# Patient Record
Sex: MALE | Race: WHITE
[De-identification: names, ages, dates, MRNs, and addresses within clinical notes are randomized per-mention and may not be internally consistent; named-entity substitution may affect disease eponyms.]

---

## 2021-06-25 ENCOUNTER — HOSPITAL ENCOUNTER (OUTPATIENT)
Dept: HOSPITAL 47 - RADNMMAIN | Age: 81
Discharge: HOME | End: 2021-06-25
Attending: FAMILY MEDICINE
Payer: MEDICARE

## 2021-06-25 DIAGNOSIS — R94.31: Primary | ICD-10-CM

## 2021-06-25 PROCEDURE — 93351 STRESS TTE COMPLETE: CPT

## 2021-06-25 NOTE — P.STRESS
- Stress Test Note


Stress Test Results/Findings: 


Exam Performed:  stress echo exercise with con


Exam Date: 06/25/21


Reason for Exam: ABNORMAL EKG





Height: 5 ft 7 in


Weight: 98.883 kg


Protocol: RICHARDSON


Stage: 1


Duration of Exercise: 1:39





Resting Heart Rate: 79


Resting Blood Pressure: 158/75


Maximum Achieved Heart Rate: 124


Maximum Achieved Blood Pressure: 158/75


85% PMHR: 119


100% PMHR: 140


METS: 2.8


Technologist Comment: 





Stress Test Results/Findings: 


Patient underwent exercise stress echo with a Richardson protocol treadmill stress 

test.  Patient exercised into Stage 1 for a total of 1 minute 39 seconds 

reaching a total of 2.8 METS.  Patient's maximum heart rate was 124 which 

represented 89 % age-predicted maximum heart rate.





Stress EKG portion:


At baseline patient's EKG showed normal sinus rhythm, normal axis, ST 

depressions V4 through V6, 2, 3, aVF.  At peak exercise, EKG showed accentuation

of ST depressions with more downsloping ST depressions up to 1 mm in the 

inferior and lateral leads which is nonspecific given Baseline abnormal EKG, 

frequent PVCs.





Stress echo portion:


2-D echocardiogram was performed in the parasternal long, personal short, apical

2 and apical four-chamber views at rest, peak exercise and in recovery.  At 

baseline, echocardiogram showed left ventricular ejection fraction 55% without 

wall motion abnormalities.  With peak exercise, echocardiogram shows basal to 

mid inferior and basal to mid inferior lateral induced hypokinesis consistent 

with ischemia.





Conclusions:


1.  Abnormal stress test with inducible inferior ischemia by echocardiogram


2.  Baseline abnormal EKG


3.  Normal ejection fraction 55%


 4. Poor exercise capacity.

## 2021-07-16 ENCOUNTER — HOSPITAL ENCOUNTER (OUTPATIENT)
Dept: HOSPITAL 47 - CATHCVL | Age: 81
End: 2021-07-16
Attending: INTERNAL MEDICINE
Payer: MEDICARE

## 2021-07-16 VITALS — BODY MASS INDEX: 35.2 KG/M2

## 2021-07-16 VITALS — RESPIRATION RATE: 18 BRPM | SYSTOLIC BLOOD PRESSURE: 149 MMHG | DIASTOLIC BLOOD PRESSURE: 70 MMHG

## 2021-07-16 VITALS — HEART RATE: 64 BPM

## 2021-07-16 VITALS — TEMPERATURE: 97.3 F

## 2021-07-16 DIAGNOSIS — Z87.891: ICD-10-CM

## 2021-07-16 DIAGNOSIS — E78.5: ICD-10-CM

## 2021-07-16 DIAGNOSIS — I10: ICD-10-CM

## 2021-07-16 DIAGNOSIS — I25.10: ICD-10-CM

## 2021-07-16 DIAGNOSIS — I67.82: Primary | ICD-10-CM

## 2021-07-16 LAB
ANION GAP SERPL CALC-SCNC: 6 MMOL/L
APTT BLD: 158 SEC (ref 22–30)
BASOPHILS # BLD AUTO: 0 K/UL (ref 0–0.2)
BASOPHILS NFR BLD AUTO: 0 %
BUN SERPL-SCNC: 15 MG/DL (ref 9–20)
CALCIUM SPEC-MCNC: 7.7 MG/DL (ref 8.4–10.2)
CHLORIDE SERPL-SCNC: 112 MMOL/L (ref 98–107)
CO2 SERPL-SCNC: 21 MMOL/L (ref 22–30)
EOSINOPHIL # BLD AUTO: 0 K/UL (ref 0–0.7)
EOSINOPHIL NFR BLD AUTO: 0 %
ERYTHROCYTE [DISTWIDTH] IN BLOOD BY AUTOMATED COUNT: 3.81 M/UL (ref 4.3–5.9)
ERYTHROCYTE [DISTWIDTH] IN BLOOD: 12.6 % (ref 11.5–15.5)
GLUCOSE BLD-MCNC: 134 MG/DL (ref 75–99)
GLUCOSE SERPL-MCNC: 130 MG/DL (ref 74–99)
HCT VFR BLD AUTO: 34.5 % (ref 39–53)
HGB BLD-MCNC: 12.1 GM/DL (ref 13–17.5)
INR PPP: 1.4 (ref ?–1.2)
LYMPHOCYTES # SPEC AUTO: 0.8 K/UL (ref 1–4.8)
LYMPHOCYTES NFR SPEC AUTO: 9 %
MCH RBC QN AUTO: 31.8 PG (ref 25–35)
MCHC RBC AUTO-ENTMCNC: 35.2 G/DL (ref 31–37)
MCV RBC AUTO: 90.4 FL (ref 80–100)
MONOCYTES # BLD AUTO: 0.2 K/UL (ref 0–1)
MONOCYTES NFR BLD AUTO: 2 %
NEUTROPHILS # BLD AUTO: 8.5 K/UL (ref 1.3–7.7)
NEUTROPHILS NFR BLD AUTO: 89 %
PLATELET # BLD AUTO: 158 K/UL (ref 150–450)
POTASSIUM SERPL-SCNC: 3.7 MMOL/L (ref 3.5–5.1)
PT BLD: 14.3 SEC (ref 9–12)
SODIUM SERPL-SCNC: 139 MMOL/L (ref 137–145)
WBC # BLD AUTO: 9.6 K/UL (ref 3.8–10.6)

## 2021-07-16 PROCEDURE — 70498 CT ANGIOGRAPHY NECK: CPT

## 2021-07-16 PROCEDURE — 85025 COMPLETE CBC W/AUTO DIFF WBC: CPT

## 2021-07-16 PROCEDURE — 93458 L HRT ARTERY/VENTRICLE ANGIO: CPT

## 2021-07-16 PROCEDURE — 70450 CT HEAD/BRAIN W/O DYE: CPT

## 2021-07-16 PROCEDURE — 80048 BASIC METABOLIC PNL TOTAL CA: CPT

## 2021-07-16 PROCEDURE — 85730 THROMBOPLASTIN TIME PARTIAL: CPT

## 2021-07-16 PROCEDURE — 85610 PROTHROMBIN TIME: CPT

## 2021-07-16 PROCEDURE — 70496 CT ANGIOGRAPHY HEAD: CPT

## 2021-07-16 NOTE — CT
EXAMINATION TYPE: CT brain wo con for TPA

 

DATE OF EXAM: 7/16/2021

 

COMPARISON: None

 

HISTORY: altered mental status, Rt sided weakness

 

CT DLP: 1036 mGycm

Automated exposure control for dose reduction was used.

 

FINDINGS: 

Mild to moderate generalized degenerative change. No midline shift. Exam limited by motion. Low-atten
uation the white matter is nonspecific but most typical remote microvascular ischemia. No acute hemor
rhage or mass effect. Orbits symmetric. Mild changes of chronic sinusitis. Craniocervical junction ma
intained.

 

IMPRESSION: 

1. LIMITED EXAM DUE TO MOTION ARTIFACT. DEGENERATIVE AND NONSPECIFIC WHITE MATTER CHANGES MOST TYPICA
L OF REMOTE WHITE MATTER ISCHEMIA. IF THERE IS CONCERN FOR ACUTE ISCHEMIA CORRELATE WITH MRI AS CLINI
DOMINIK WARRANTED.

2. NO EVIDENCE OF ACUTE HEMORRHAGE OR MASS EFFECT.

## 2021-07-16 NOTE — CT
EXAMINATION TYPE: CT angio head neck

 

DATE OF EXAM: 7/16/2021

 

HISTORY: CVA

 

COMPARISON: CT scan 7/16/2021

 

CT DLP: 407 mGycm.  Automated Exposure Control for Dose Reduction was Utilized.

 

TECHNIQUE:  CTA scan of the neck is performed without and with IV Contrast, patient injected with 65 
ml mL of Isovue 370, axial images are obtained, coronal and sagittal reformatted images are reviewed.
 Three-D reconstructed images are created on an independent workstation and reviewed.

 

FINDINGS:

 

There is abrupt truncation of the left middle cerebral artery suspicious for left MCA occlusion. Vert
ebrobasilar system is patent with slightly dominant right vertebral artery. Posterior cerebral arteri
es are somewhat diminutive in size but appear to enhance. Degenerative change of the spine and low at
tenuation white matter is nonspecific but most typical remote white matter ischemia. No sizable aneur
ysm or vascular malformation.

 

It is extensive irregularity involving the carotid arteries on the right with the plaque involving th
e proximal internal carotid artery. Approximately 60-70% stenosis suspected. Assessment of the left p
roximal ICA is limited. There is extensive atherosclerotic plaque and suspected significant greater t
key 70% stenosis. Groundglass changes seen in the bilateral upper lobes. Hypertrophic and degenerativ
e changes of the spine. Atherosclerotic change of the aorta.

 

IMPRESSION:

1. 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 Report called to referring clinician.

2. Suspected right proximal ICA significant stenosis. Evaluation of the left ICA is limited due to te
chnical factors could not exclude a significant stenosis.

## 2021-07-16 NOTE — CC
CARDIAC CATHETERIZATION REPORT



PROCEDURE:

Cardiac catheterization.



INDICATION:

Exertional shortness of breath with abnormal stress echo showing ischemia in the

inferior and inferolateral zone.



PROCEDURE NOTE:

After obtaining informed consent,, left heart catheterization and coronary angiogram

are performed via the right femoral artery using standard Kirby catheters. The

patient tolerated the procedure well without any obvious immediate complications.  A

femoral angiogram was performed and Angio-Seal will be deployed for hemostasis.



HEMODYNAMICS:

Left ventricular end-diastolic pressure is 14-16 mm.  There is no significant gradient

across the aortic valve.



LEFT VENTRICULOGRAM:

Not performed.



ANGIOGRAPHIC DATA:

The left main coronary artery: Left main coronary artery is a normal-sized vessel and

is free of stenosis.  Divides into left anterior descending coronary artery and

circumflex coronary artery. Circumflex coronary artery is a nondominant vessel, gives

off a large caliber OM branch and continues as a small AV groove circ.  There are 2

areas of tight stenosis within the OM, the proximal 70-80% and distal focal 70%

stenosis.  LAD shows a mild atherosclerotic plaque in its midportion. Right coronary

artery is a large dominant vessel, shows mild diffuse disease involving the PDA and PLV

branches.



CONCLUSIONS:

Severe single-vessel coronary artery disease, which explains his stress test

abnormality.



PLAN:

I reviewed angiographic data with Dr. Baez, the on-call interventionist, who will

attempt angioplasty of circumflex coronary artery.





MMODL / IJN: [id number] / Job#: [id number]

## 2021-07-16 NOTE — PTCA
PERCUTANEOUSTRANS CORORONARY ANGIOGRAPHY



DATE OF SERVICE:

07/16/2021



PERFORMING PHYSICIAN:

Mark Baez MD.



PROCEDURE PERFORMED:

1. Successful stenting of the distal left circumflex coronary artery using 2.5 x 15 mm

    Xience drug-eluting stent with an excellent angiographic result.

2. Successful stenting of the proximal left circumflex using 3.5 x 18 mm Xience drug-

    eluting stent with an excellent angiographic result.



INDICATION:

This is an 81-year-old gentleman who sees Dr. Jeffers in the office regularly, who was

experiencing symptoms of chest discomfort and underwent a heart catheterization earlier

today and that showed severe disease involving the distal and proximal left circumflex.

Because of that, a PCI was advised.



APPROACH:

Right common femoral artery.



COMPLICATION:

None.



LEVEL OF SEDATION:

Moderate with sedation length of 30 minutes.



PROCEDURE DESCRIPTION:

Please refer to diagnostic heart catheterization that was performed by Dr. Jeffers

earlier today.



Anticoagulation was initiated using heparin.



Subsequently, the left main was engaged using an EBU guide.  I wired the left

circumflex using a run-through wire.  After that, I did balloon angioplasty using 2.5 x

12 mm balloon for the distal and proximal left circumflex.  Attempting advancing 2.5 mm

stent to the distal left circumflex was unsuccessful because the stent would not make

the turn in the proximal tortuous area.  I did balloon angioplasty again using 3.5 mm

balloon and in spite of that the stent was unable to go.  At that point, I decided to

use a GuideLiner.  With GuideLiner use, I was able to get the stent distally, as well

as proximally.  Distally I deployed 2.5 x 15 mm stent and proximally I deployed 3.5 x

18 mm.  Both stents were deployed under 14 atmospheres for 20 seconds with an excellent

angiographic result.



POSTPROCEDURE MANAGEMENT:

1. Dual anti-platelet therapy.

2. Aggressive cholesterol control.

3. Risk factor modifications.

4. Follow up with the patient.





MMODL / IJN: [id number] / Job#: [id number]